# Patient Record
Sex: FEMALE | Race: WHITE | Employment: STUDENT | URBAN - METROPOLITAN AREA
[De-identification: names, ages, dates, MRNs, and addresses within clinical notes are randomized per-mention and may not be internally consistent; named-entity substitution may affect disease eponyms.]

---
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
## 2024-01-10 ENCOUNTER — OFFICE VISIT (OUTPATIENT)
Dept: OBSTETRICS AND GYNECOLOGY | Facility: CLINIC | Age: 24
End: 2024-01-10
Payer: COMMERCIAL

## 2024-01-10 ENCOUNTER — LAB VISIT (OUTPATIENT)
Dept: LAB | Facility: OTHER | Age: 24
End: 2024-01-10
Payer: COMMERCIAL

## 2024-01-10 VITALS
BODY MASS INDEX: 29.24 KG/M2 | WEIGHT: 175.5 LBS | SYSTOLIC BLOOD PRESSURE: 114 MMHG | HEIGHT: 65 IN | DIASTOLIC BLOOD PRESSURE: 80 MMHG

## 2024-01-10 DIAGNOSIS — T36.95XA ANTIBIOTIC-INDUCED YEAST INFECTION: ICD-10-CM

## 2024-01-10 DIAGNOSIS — Z72.9 PROBLEM RELATED TO LIFESTYLE: ICD-10-CM

## 2024-01-10 DIAGNOSIS — B37.9 ANTIBIOTIC-INDUCED YEAST INFECTION: ICD-10-CM

## 2024-01-10 DIAGNOSIS — N93.9 VAGINAL SPOTTING: ICD-10-CM

## 2024-01-10 DIAGNOSIS — Z11.3 SCREENING FOR STD (SEXUALLY TRANSMITTED DISEASE): ICD-10-CM

## 2024-01-10 DIAGNOSIS — N89.8 VAGINAL DISCHARGE: Primary | ICD-10-CM

## 2024-01-10 LAB
HBV SURFACE AG SERPL QL IA: NORMAL
HCV AB SERPL QL IA: NORMAL
HIV 1+2 AB+HIV1 P24 AG SERPL QL IA: NORMAL

## 2024-01-10 PROCEDURE — 36415 COLL VENOUS BLD VENIPUNCTURE: CPT

## 2024-01-10 PROCEDURE — 87389 HIV-1 AG W/HIV-1&-2 AB AG IA: CPT

## 2024-01-10 PROCEDURE — 99999 PR PBB SHADOW E&M-EST. PATIENT-LVL III: CPT | Mod: PBBFAC,,,

## 2024-01-10 PROCEDURE — 87491 CHLMYD TRACH DNA AMP PROBE: CPT

## 2024-01-10 PROCEDURE — 86592 SYPHILIS TEST NON-TREP QUAL: CPT

## 2024-01-10 PROCEDURE — 87340 HEPATITIS B SURFACE AG IA: CPT

## 2024-01-10 PROCEDURE — 99204 OFFICE O/P NEW MOD 45 MIN: CPT | Mod: S$GLB,,,

## 2024-01-10 PROCEDURE — 86803 HEPATITIS C AB TEST: CPT

## 2024-01-10 RX ORDER — CEFACLOR 250 MG
250 CAPSULE ORAL
COMMUNITY
Start: 2024-01-03 | End: 2024-01-13

## 2024-01-10 RX ORDER — FLUCONAZOLE 200 MG/1
200 TABLET ORAL
Qty: 2 TABLET | Refills: 0 | Status: SHIPPED | OUTPATIENT
Start: 2024-01-10

## 2024-01-10 NOTE — PROGRESS NOTES
"CC: STD testing/ vaginal discharge     HPI:  Julia Mejia is a 23 y.o. female  presents to walk in clinic with complaint of vaginal discharge for the past 5 days. Reports some spotting as well. Reports itching. Denies odor. States she is taking antibiotics for an ear infection. Thinks that she may have a yeast infection from antibiotics. Used monistat 3- finished last night. States this helped her symptoms. Does report being SA with multiple partners, condoms sometimes. Has an IUD in place- placed 23.    History reviewed. No pertinent past medical history.  History reviewed. No pertinent surgical history.  Social History     Tobacco Use    Smoking status: Never    Smokeless tobacco: Never   Substance Use Topics    Alcohol use: Not Currently     Comment: SOCIALLY    Drug use: Never     History reviewed. No pertinent family history.  OB History    Para Term  AB Living   0 0 0 0 0 0   SAB IAB Ectopic Multiple Live Births   0 0 0 0 0       /80   Ht 5' 5" (1.651 m)   Wt 79.6 kg (175 lb 7.8 oz)   BMI 29.20 kg/m²     ROS:  GENERAL: No fever, chills, fatigability or weight loss.  VULVAR: No pain, no lesions and no itching.  VAGINAL: No relaxation, no abnormal bleeding and no lesions. +itching, discharge  ABDOMEN: No abdominal pain. Denies nausea. Denies vomiting. No diarrhea. No constipation  BREAST: Denies pain. No lumps. No discharge.  URINARY: No incontinence, no nocturia, no frequency and no dysuria.  CARDIOVASCULAR: No chest pain. No shortness of breath. No leg cramps.  NEUROLOGICAL: No headaches. No vision changes.    PHYSICAL EXAM:  VULVA: normal appearing vulva with no masses, tenderness or lesions   VAGINA: normal appearing vagina with normal color. Bloody discharge, no lesions   CERVIX: normal appearing cervix without discharge or lesions, IUD visualized     ASSESSMENT and PLAN:    ICD-10-CM ICD-9-CM    1. Vaginal discharge  N89.8 623.5 C. trachomatis/N. gonorrhoeae by AMP " DNA [facility]; Urine      fluconazole (DIFLUCAN) 200 MG Tab      2. Screening for STD (sexually transmitted disease)  Z11.3 V74.5 HIV 1/2 Ag/Ab (4th Gen)      RPR      Hepatitis B Surface Antigen      HEPATITIS C ANTIBODY      C. trachomatis/N. gonorrhoeae by AMP DNA [facility]; Urine      3. Problem related to lifestyle  Z72.9 V69.9 HIV 1/2 Ag/Ab (4th Gen)      RPR      Hepatitis B Surface Antigen      HEPATITIS C ANTIBODY      C. trachomatis/N. gonorrhoeae by AMP DNA [facility]; Urine      4. Vaginal spotting  N93.9 623.8 C. trachomatis/N. gonorrhoeae by AMP DNA [facility]; Urine      5. Antibiotic-induced yeast infection  B37.9 112.9 fluconazole (DIFLUCAN) 200 MG Tab    T36.95XA E930.9         - GC/CT urine   - AFFIRM deferred due to recent monistat use and insurance - suspect yeast due to exam, s/s- Diflucan sent   - STD blood work ordered   - Discussed spotting can be normal with IUD- will r/o infection    Patient was counseled today on vaginitis prevention including :  a. avoiding feminine products such as deoderant soaps, body wash, bubble bath, douches, scented toilet paper, deoderant tampons or pads, feminine wipes, chronic pad use, etc.  b. avoiding other vulvovaginal irritants such as long hot baths, humidity, tight, synthetic clothing, chlorine and sitting around in wet bathing suits  c. wearing cotton underwear, avoiding thong underwear and no underwear to bed  d. taking showers instead of baths and use a hair dryer on cool setting afterwards to dry  e. wearing cotton to exercise and shower immediately after exercise and change clothes  f. using polyurethane condoms without spermicide if sexually active and symptoms are triggered by intercourse    FOLLOW UP: PRN lack of improvement.    FARRAH Tello

## 2024-01-11 LAB — RPR SER QL: NORMAL

## 2024-01-12 LAB
C TRACH DNA SPEC QL NAA+PROBE: NOT DETECTED
N GONORRHOEA DNA SPEC QL NAA+PROBE: NOT DETECTED

## 2024-05-16 ENCOUNTER — OFFICE VISIT (OUTPATIENT)
Dept: OBSTETRICS AND GYNECOLOGY | Facility: CLINIC | Age: 24
End: 2024-05-16
Payer: COMMERCIAL

## 2024-05-16 VITALS
DIASTOLIC BLOOD PRESSURE: 81 MMHG | WEIGHT: 172.38 LBS | SYSTOLIC BLOOD PRESSURE: 122 MMHG | HEIGHT: 65 IN | BODY MASS INDEX: 28.72 KG/M2

## 2024-05-16 DIAGNOSIS — N76.0 ACUTE VAGINITIS: Primary | ICD-10-CM

## 2024-05-16 DIAGNOSIS — Z20.2 POTENTIAL EXPOSURE TO STD: ICD-10-CM

## 2024-05-16 PROCEDURE — 99999 PR PBB SHADOW E&M-EST. PATIENT-LVL III: CPT | Mod: PBBFAC,,, | Performed by: FAMILY MEDICINE

## 2024-05-16 PROCEDURE — 87491 CHLMYD TRACH DNA AMP PROBE: CPT | Performed by: FAMILY MEDICINE

## 2024-05-16 PROCEDURE — 81514 NFCT DS BV&VAGINITIS DNA ALG: CPT | Performed by: FAMILY MEDICINE

## 2024-05-16 PROCEDURE — 87591 N.GONORRHOEAE DNA AMP PROB: CPT | Performed by: FAMILY MEDICINE

## 2024-05-16 PROCEDURE — 99213 OFFICE O/P EST LOW 20 MIN: CPT | Mod: S$GLB,,, | Performed by: FAMILY MEDICINE

## 2024-05-16 RX ORDER — METRONIDAZOLE 7.5 MG/G
1 GEL VAGINAL NIGHTLY
Qty: 70 G | Refills: 0 | Status: SHIPPED | OUTPATIENT
Start: 2024-05-16 | End: 2024-05-21

## 2024-05-16 NOTE — PROGRESS NOTES
CC: Vaginitis  HPI: Patient presents for evaluation of an abnormal vaginal discharge. Symptoms have been present for 1 week. Vaginal symptoms: bumps, discharge described as white and milky, and local irritation. Contraception: mirena.  Sexually transmitted infection risk: very low risk of STD exposure. SA male partner, does not use condoms. Also a vaginal bump around the clitoris she noticed 1 week ago. Not getting larger, no fever/drainage. No hx of genital HSV or exposure she knew of. This is the extent of the patient's complaints at this time.     ROS:  GENERAL: No fever, chills, fatigability or weight loss.  VULVAR: No pain, + lesions and no itching.  VAGINAL: No relaxation, + itching, + discharge, no abnormal bleeding and no lesions.  URINARY: No incontinence, no nocturia, no frequency and no dysuria.     PHYSICAL EXAM:  Physical Exam:   Constitutional: She appears well-developed and well-nourished. She does not appear ill. No distress.               Genitourinary:    Uterus, right adnexa and left adnexa normal.            Pelvic exam was performed with patient in the lithotomy position.   The external female genitalia was normal.     Labial bartholins normal.There is no rash, tenderness or lesion on the right labia. There is no rash, tenderness or lesion on the left labia. Cervix is normal. Right adnexum displays no mass, no tenderness and no fullness. Left adnexum displays no mass, no tenderness and no fullness. There is vaginal discharge (slightly yellow milky, no odor) in the vagina. No tenderness, bleeding, rectocele, cystocele or prolapse of vaginal walls in the vagina.    No foreign body in the vagina.   Cervix exhibits no motion tenderness, no lesion, no discharge, no friability and no polyp. Normal urethral meatus.Urethra findings: no urethral massIUD strings visualized.              Neurological: She is alert. GCS eye subscore is 4. GCS verbal subscore is 5. GCS motor subscore is 6.            History reviewed. No pertinent past medical history.    History reviewed. No pertinent surgical history.    No family history on file.    Social History     Socioeconomic History    Marital status: Single   Tobacco Use    Smoking status: Never    Smokeless tobacco: Never   Substance and Sexual Activity    Alcohol use: Yes     Comment: SOCIALLY    Drug use: Never    Sexual activity: Yes     Partners: Male     Birth control/protection: I.U.D.     Social Determinants of Health     Financial Resource Strain: Low Risk  (7/6/2023)    Received from [facility] Everywhere    Overall Financial Resource Strain (CARDIA)     Difficulty of Paying Living Expenses: Not hard at all   Food Insecurity: No Food Insecurity (7/6/2023)    Received from [facility]    Hunger Vital Sign     Worried About Running Out of Food in the Last Year: Never true     Ran Out of Food in the Last Year: Never true   Transportation Needs: No Transportation Needs (7/6/2023)    Received from [facility] Everywhere    PRAPARE - Transportation     Lack of Transportation (Medical): No     Lack of Transportation (Non-Medical): No   Physical Activity: Sufficiently Active (7/6/2023)    Received from [facility]    Exercise Vital Sign     Days of Exercise per Week: 4 days     Minutes of Exercise per Session: 40 min   Stress: No Stress Concern Present (7/6/2023)    Received from [facility]    [employer] - Occupational Stress Questionnaire     Feeling of Stress : Only a little   Housing Stability: Low Risk  (7/6/2023)    Received from [facility]    Housing Stability Vital Sign     Unable to Pay for Housing in the Last Year: No     Number of Places Lived in the Last Year: 1     In the last 12 months, was there a time when you did not have a steady place to sleep or slept in a shelter (including now)?: No       Current Outpatient Medications   Medication Sig Dispense  Refill    levonorgestreL (MIRENA) 21 mcg/24 hours (8 yrs) 52 mg IUD 1 each by Intrauterine route once.      fluconazole (DIFLUCAN) 200 MG Tab Take 1 tablet (200 mg total) by mouth every 72 hours as needed (yeast). (Patient not taking: Reported on 2024) 2 tablet 0    metroNIDAZOLE (METROGEL) 0.75 % (37.5mg/5 gram) vaginal gel Place 1 applicator vaginally every evening. For 5 nights - do not drink alcohol while taking and for 2 days after stopping for 5 days 70 g 0     No current facility-administered medications for this visit.       Review of patient's allergies indicates:   Allergen Reactions    Minocycline Rash    Amoxicillin Nausea And Vomiting, Hives, Nausea Only and Rash     As an infant/toddler          OB History    Para Term  AB Living   0 0 0 0 0 0   SAB IAB Ectopic Multiple Live Births   0 0 0 0 0          Assessment/Plan:    Acute vaginitis  -     Vaginosis Screen by DNA Probe  -     metroNIDAZOLE (METROGEL) 0.75 % (37.5mg/5 gram) vaginal gel; Place 1 applicator vaginally every evening. For 5 nights - do not drink alcohol while taking and for 2 days after stopping for 5 days  Dispense: 70 g; Refill: 0    Potential exposure to STD  -     C. trachomatis/N. gonorrhoeae by AMP DNA [facility]; Cervicovaginal      Discussed unsure of insurance coverage with vaginosis swab. Pt would still like to do.   -notify clinic if vaginal bump changing/worsening    Patient was counseled today on vaginitis prevention including :  a. avoiding feminine products such as deoderant soaps, body wash, bubble bath, douches, scented toilet paper, deoderant tampons or pads, feminine wipes, chronic pad use, etc.  b. avoiding other vulvovaginal irritants such as long hot baths, humidity, tight, synthetic clothing, chlorine and sitting around in wet bathing suits  c. wearing cotton underwear, avoiding thong underwear and no underwear to bed  d. taking showers instead of baths and use a hair dryer on cool setting  afterwards to dry  e. wearing cotton to exercise and shower immediately after exercise and change clothes  f. using polyurethane condoms without spermicide if sexually active and symptoms are triggered by intercourse     FOLLOW UP: PRN/lack of improvement.

## 2024-05-20 LAB
BACTERIAL VAGINOSIS DNA: NEGATIVE
C TRACH DNA SPEC QL NAA+PROBE: NOT DETECTED
CANDIDA GLABRATA DNA: NEGATIVE
CANDIDA KRUSEI DNA: NEGATIVE
CANDIDA RRNA VAG QL PROBE: NEGATIVE
N GONORRHOEA DNA SPEC QL NAA+PROBE: NOT DETECTED
T VAGINALIS RRNA GENITAL QL PROBE: NEGATIVE

## 2024-12-05 ENCOUNTER — TELEPHONE (OUTPATIENT)
Dept: SURGERY | Facility: CLINIC | Age: 24
End: 2024-12-05
Payer: COMMERCIAL

## 2024-12-06 ENCOUNTER — OFFICE VISIT (OUTPATIENT)
Facility: CLINIC | Age: 24
End: 2024-12-06
Payer: COMMERCIAL

## 2024-12-06 VITALS
SYSTOLIC BLOOD PRESSURE: 146 MMHG | WEIGHT: 174.63 LBS | HEIGHT: 65 IN | HEART RATE: 91 BPM | BODY MASS INDEX: 29.09 KG/M2 | RESPIRATION RATE: 18 BRPM | DIASTOLIC BLOOD PRESSURE: 86 MMHG

## 2024-12-06 DIAGNOSIS — K64.4 HEMORRHOIDAL SKIN TAGS: Primary | ICD-10-CM

## 2024-12-06 DIAGNOSIS — K62.89 HYPERTROPHIED ANAL PAPILLA: ICD-10-CM

## 2024-12-06 PROCEDURE — 99999 PR PBB SHADOW E&M-EST. PATIENT-LVL III: CPT | Mod: PBBFAC,,, | Performed by: COLON & RECTAL SURGERY

## 2024-12-06 RX ORDER — MULTIVITAMIN
1 TABLET ORAL DAILY
COMMUNITY

## 2024-12-06 RX ORDER — CETIRIZINE HYDROCHLORIDE 10 MG/1
10 TABLET ORAL DAILY
COMMUNITY

## 2024-12-06 RX ORDER — SPIRONOLACTONE 25 MG/1
25 TABLET ORAL DAILY
COMMUNITY
Start: 2024-07-01

## 2024-12-06 RX ORDER — FLUTICASONE PROPIONATE 50 MCG
1 SPRAY, SUSPENSION (ML) NASAL DAILY
COMMUNITY

## 2024-12-06 RX ORDER — LISDEXAMFETAMINE DIMESYLATE 30 MG/1
30 CAPSULE ORAL EVERY MORNING
COMMUNITY
Start: 2024-11-07

## 2024-12-06 NOTE — PROGRESS NOTES
"Subjective:       Patient ID: Julia Mejia is a 24 y.o. female.    Chief Complaint: Hemorrhoids    HPI  23 yo F has had issues w "hemorrhoids" for years - mainly pain/swelling/discomfort when they flare up - PCP has been helping her manage them conservatively - [place] [facility] [place] but her surgeon cancelled. She previously had issues with constipation/straining - she now is more careful about what she eats, takes stool softener daily, is more careful about not straining - typically has a soft, formed BM 1-2x/day. No rectal bleeding or blood in stool.  She has not had a flare in 8 months.     Last colonoscopy - none  No family hx of CRC or IBD.    Review of patient's allergies indicates:   Allergen Reactions    Minocycline Rash    Amoxicillin Nausea And Vomiting, Hives, Nausea Only and Rash     As an infant/toddler       History reviewed. No pertinent past medical history.    History reviewed. No pertinent surgical history.    Current Outpatient Medications   Medication Sig Dispense Refill    cetirizine (ZYRTEC) 10 MG tablet Take 10 mg by mouth once daily.      docusate sodium (COLACE) 50 MG capsule Take 50 mg by mouth 2 (two) times daily.      fluticasone propionate (FLONASE) 50 mcg/actuation nasal spray 1 spray by Each Nostril route once daily.      lisdexamfetamine (VYVANSE) 30 MG capsule Take 30 mg by mouth every morning.      multivitamin (THERAGRAN) per tablet Take 1 tablet by mouth once daily.      spironolactone (ALDACTONE) 25 MG tablet Take 25 mg by mouth once daily.      levonorgestreL (MIRENA) 21 mcg/24 hours (8 yrs) 52 mg IUD 1 each by Intrauterine route once.       No current facility-administered medications for this visit.       Family History   Problem Relation Name Age of Onset    Crohn's disease Maternal Grandfather      Ulcerative colitis Maternal Cousin         Social History     Socioeconomic History    Marital status: Single   Tobacco Use    Smoking status: Never    " "Smokeless tobacco: Never   Substance and Sexual Activity    Alcohol use: Yes     Comment: SOCIALLY    Drug use: Never    Sexual activity: Yes     Partners: Male     Birth control/protection: I.U.D.     Social Drivers of Health     Financial Resource Strain: Low Risk  (7/6/2023)    Received from [facility]    Overall Financial Resource Strain (CARDIA)     Difficulty of Paying Living Expenses: Not hard at all   Food Insecurity: No Food Insecurity (7/6/2023)    Received from [facility]    Hunger Vital Sign     Worried About Running Out of Food in the Last Year: Never true     Ran Out of Food in the Last Year: Never true   Transportation Needs: No Transportation Needs (7/6/2023)    Received from [facility] [employer]    PRAPARE - Transportation     Lack of Transportation (Medical): No     Lack of Transportation (Non-Medical): No   Physical Activity: Sufficiently Active (7/6/2023)    Received from [facility]    Exercise Vital Sign     Days of Exercise per Week: 4 days     Minutes of Exercise per Session: 40 min   Stress: No Stress Concern Present (7/6/2023)    Received from [facility] - Occupational Stress Questionnaire     Feeling of Stress : Only a little   Housing Stability: Low Risk  (7/6/2023)    Received from [facility]    Housing Stability Vital Sign     Unable to Pay for Housing in the Last Year: No     Number of Places Lived in the Last Year: 1     Unstable Housing in the Last Year: No       Review of Systems   Constitutional:  Negative for chills and fever.   HENT:  Negative for congestion and sore throat.    Eyes:  Negative for visual disturbance.   Respiratory:  Negative for cough and shortness of breath.    Cardiovascular:  Negative for chest pain and palpitations.   Gastrointestinal:  Positive for rectal pain (When hemorrhoids are "flaring"). Negative for abdominal distention, abdominal pain, " "anal bleeding, blood in stool, constipation, diarrhea, nausea and vomiting.   Endocrine: Negative for cold intolerance and heat intolerance.   Genitourinary:  Negative for dysuria and frequency.   Musculoskeletal:  Negative for arthralgias, back pain and neck pain.   Skin:  Negative for rash.   Allergic/Immunologic: Negative for immunocompromised state.   Neurological:  Negative for dizziness, light-headedness and headaches.   Hematological:  Does not bruise/bleed easily.   Psychiatric/Behavioral:  Negative for confusion. The patient is not nervous/anxious.        Objective:      Physical Exam  Vitals reviewed. Exam conducted with a chaperone present.   Constitutional:       Appearance: She is well-developed.   HENT:      Head: Normocephalic.   Pulmonary:      Effort: Pulmonary effort is normal. No respiratory distress.   Abdominal:      General: There is no distension.      Palpations: Abdomen is soft. There is no mass.      Tenderness: There is no abdominal tenderness. There is no guarding or rebound.   Genitourinary:     Comments: Perineum - normal perianal skin, no mass, no fissure, small external hemorrhoidal tags  JOSEPH and RL, larger pedunculated tag PML  ARMANDO - good tone, no mass  Anoscopy - Grade 1 internal hemorrhoids, +prolapsing internal hypertrophied papilla    Musculoskeletal:         General: Normal range of motion.   Skin:     General: Skin is warm and dry.   Neurological:      Mental Status: She is alert and oriented to person, place, and time.           No results found for: "WBC", "HGB", "HCT", "MCV", "PLT"  BMP  Lab Results   Component Value Date     10/28/2022    K 4.2 10/28/2022    CO2 26 10/28/2022    BUN 9 10/28/2022    CREATININE 0.66 10/28/2022    CALCIUM 9.4 10/28/2022     CMP  Sodium   Date Value Ref Range Status   10/28/2022 138 135 - 146 mmol/L Final     Potassium   Date Value Ref Range Status   10/28/2022 4.2 3.5 - 5.3 mmol/L Final     Carbon Dioxide   Date Value Ref Range Status " "  10/28/2022 26 20 - 32 mmol/L Final     Glucose   Date Value Ref Range Status   10/28/2022 89 65 - 99 mg/dL Final     Comment:                  Fasting reference interval     Blood Urea Nitrogen   Date Value Ref Range Status   10/28/2022 9 7 - 25 mg/dL Final     Creatinine   Date Value Ref Range Status   10/28/2022 0.66 0.50 - 0.96 mg/dL Final     Calcium   Date Value Ref Range Status   10/28/2022 9.4 8.6 - 10.2 mg/dL Final     Albumin Level   Date Value Ref Range Status   10/28/2022 4.4 3.6 - 5.1 g/dL Final     Total Bilirubin   Date Value Ref Range Status   10/28/2022 0.5 0.2 - 1.2 mg/dL Final     Alkaline Phosphatase   Date Value Ref Range Status   10/28/2022 50 31 - 125 U/L Final     AST   Date Value Ref Range Status   10/28/2022 15 10 - 30 U/L Final     ALT   Date Value Ref Range Status   10/28/2022 9 6 - 29 U/L Final     No results found for: "CEA"  No results found for: "CRP"        Assessment:       1. Hemorrhoidal skin tags    2. Hypertrophied anal papilla        Plan:   Discussed excision of pedunculated skin tag and hypertrophied papilla - she would like to proceed but will call to set up a surgery date after the New Year.    I have discussed the procedure at length with Julia Mejia.  We discussed the rationale, risks, benefits, and alternatives in depth.  We discussed the expected outcomes and potential complications including but not limited to wound dehiscence/delayed wound healing, bleeding, infection, recurrence, prolonged pain, need for further procedures, and altered continence.  She verbalized her understanding of the procedure and wishes to proceed.  Written consent was obtained.    Jaxon Bailey MD, FACS, FASCRS  , Department of Colon & Rectal Surgery     This note was created using voice recognition software, and may contain some unrecognized transcriptional errors.     "

## 2024-12-09 ENCOUNTER — TELEPHONE (OUTPATIENT)
Dept: SURGERY | Facility: CLINIC | Age: 24
End: 2024-12-09
Payer: COMMERCIAL

## 2024-12-09 DIAGNOSIS — K64.4 HEMORRHOIDAL SKIN TAGS: ICD-10-CM

## 2024-12-09 DIAGNOSIS — K62.89 HYPERTROPHIED ANAL PAPILLA: Primary | ICD-10-CM

## 2024-12-09 NOTE — TELEPHONE ENCOUNTER
"----- Message from Jenny sent at 12/9/2024 10:35 AM CST -----  Regarding: pt advice  Contact: [phone]  .Name Of Caller: Self     Contact Preference?: [phone]        What is the nature of the call?: needing to speak with nurse raul Arredondo call      Additional Notes:  "Thank you for all that you do for our patients"  "

## 2024-12-09 NOTE — TELEPHONE ENCOUNTER
Spoke with patient, she would like to schedule her surgery with Dr Bailey for 1/24/2025. Will email pre op instructions.

## 2025-02-03 ENCOUNTER — TELEPHONE (OUTPATIENT)
Dept: SURGERY | Facility: CLINIC | Age: 25
End: 2025-02-03
Payer: COMMERCIAL

## 2025-02-03 ENCOUNTER — OFFICE VISIT (OUTPATIENT)
Dept: OBSTETRICS AND GYNECOLOGY | Facility: CLINIC | Age: 25
End: 2025-02-03
Payer: COMMERCIAL

## 2025-02-03 ENCOUNTER — LAB VISIT (OUTPATIENT)
Dept: LAB | Facility: OTHER | Age: 25
End: 2025-02-03
Attending: FAMILY MEDICINE
Payer: COMMERCIAL

## 2025-02-03 VITALS
SYSTOLIC BLOOD PRESSURE: 122 MMHG | WEIGHT: 167.56 LBS | DIASTOLIC BLOOD PRESSURE: 82 MMHG | BODY MASS INDEX: 27.92 KG/M2 | HEIGHT: 65 IN

## 2025-02-03 DIAGNOSIS — Z20.2 POTENTIAL EXPOSURE TO STD: ICD-10-CM

## 2025-02-03 DIAGNOSIS — Z20.2 POTENTIAL EXPOSURE TO STD: Primary | ICD-10-CM

## 2025-02-03 LAB
HBV SURFACE AG SERPL QL IA: NORMAL
HCV AB SERPL QL IA: NEGATIVE
HIV 1+2 AB+HIV1 P24 AG SERPL QL IA: NEGATIVE
TREPONEMA PALLIDUM IGG+IGM AB [PRESENCE] IN SERUM OR PLASMA BY IMMUNOASSAY: NONREACTIVE

## 2025-02-03 PROCEDURE — 36415 COLL VENOUS BLD VENIPUNCTURE: CPT | Performed by: FAMILY MEDICINE

## 2025-02-03 PROCEDURE — 86803 HEPATITIS C AB TEST: CPT | Performed by: FAMILY MEDICINE

## 2025-02-03 PROCEDURE — 87340 HEPATITIS B SURFACE AG IA: CPT | Performed by: FAMILY MEDICINE

## 2025-02-03 PROCEDURE — 99999 PR PBB SHADOW E&M-EST. PATIENT-LVL III: CPT | Mod: PBBFAC,,, | Performed by: FAMILY MEDICINE

## 2025-02-03 PROCEDURE — 87389 HIV-1 AG W/HIV-1&-2 AB AG IA: CPT | Performed by: FAMILY MEDICINE

## 2025-02-03 PROCEDURE — 87491 CHLMYD TRACH DNA AMP PROBE: CPT | Performed by: FAMILY MEDICINE

## 2025-02-03 PROCEDURE — 86593 SYPHILIS TEST NON-TREP QUANT: CPT | Performed by: FAMILY MEDICINE

## 2025-02-03 PROCEDURE — 99213 OFFICE O/P EST LOW 20 MIN: CPT | Mod: S$GLB,,, | Performed by: FAMILY MEDICINE

## 2025-02-03 NOTE — TELEPHONE ENCOUNTER
----- Message from Corina sent at 2/3/2025  8:32 AM CST -----  Name of Caller:     Nature of Call:requesting an appt     Best Call Back Number:[phone]     Additional Information:HANH CASTILLO calling regarding Appointment Access  for wanting to reschedule the procedure. Pt stated she couldn't make due to weather. Please call back to assist.

## 2025-02-03 NOTE — PROGRESS NOTES
Note written by TATYANA coelho student. Addended by myself where needed. I was present for exam/discussion.       Chief Complaint: STD testing      HPI:   Pt is a 24 y.o. here today desiring STD testing. SA with male partners without condoms. Reports multiple male partners. Has no specific std concerns but likes to do routine testing. Denies vaginal discharge/itching/uti sx/fever/pelvic pain. Reports small bump to the L labia majora first noticed 3 days ago. Pt unsure if this is an ingrown hair. No drainage, no hx of HSV.This is the extent of the patient's complaints at this time.     ROS:  GENERAL: No fever, chills, fatigability or weight loss.  VULVAR: No pain, (+) lesions and no itching.  VAGINAL: No relaxation, no itching, no discharge, no abnormal bleeding and no lesions.  URINARY: No incontinence, no nocturia, no frequency and no dysuria.     PHYSICAL EXAM:  Physical Exam:   Constitutional: She appears well-developed and well-nourished. She does not appear ill. No distress.             Abdominal: Hernia confirmed negative in the right inguinal area and confirmed negative in the left inguinal area.     Genitourinary:    Urethra, vagina, uterus, right adnexa and left adnexa normal.            Pelvic exam was performed with patient in the lithotomy position.   The external female genitalia was normal.   No external genitalia lesions identified,  Labial bartholins normal.There is no rash, tenderness, lesion or injury on the right labia. There is lesion (very small mildly erythematous macular lesion. no vesicles/pustules/active drainage/fluctuance) on the left labia. There is no rash, tenderness or injury on the left labia. Cervix is normal. No no adexnal prolapse or no masses or organomegaly. Right adnexum displays no mass, no tenderness and no fullness. Left adnexum displays no mass, no tenderness and no fullness. Vagina exhibits no lesion. No erythema, vaginal discharge, tenderness, bleeding, rectocele,  cystocele or prolapse of vaginal walls in the vagina.    No foreign body in the vagina.      No signs of injury in the vagina.   Cervix exhibits no motion tenderness, no lesion, no discharge, no friability, no tenderness and no polyp. Uterus is not deviated, not enlarged, not fixed, not tender, not hosting fibroids and no uterine prolapse. Normal urethral meatus.Urethral Meatus exhibits: no urethral lesionUrethra findings: no urethral mass, no tenderness, no urethral scarring and no prolapsedIUD strings visualized.             Lymphadenopathy: No inguinal adenopathy noted on the right or left side.    Neurological: She is alert. GCS eye subscore is 4. GCS verbal subscore is 5. GCS motor subscore is 6.         History reviewed. No pertinent past medical history.    History reviewed. No pertinent surgical history.    Family History   Problem Relation Name Age of Onset    Crohn's disease Maternal Grandfather      Ulcerative colitis Maternal Cousin         Social History     Socioeconomic History    Marital status: Single   Tobacco Use    Smoking status: Never    Smokeless tobacco: Never   Substance and Sexual Activity    Alcohol use: Yes     Comment: SOCIALLY    Drug use: Never    Sexual activity: Yes     Partners: Male     Birth control/protection: I.U.D.     Social Drivers of Health     Financial Resource Strain: Low Risk  (7/6/2023)    Received from [facility]    Overall Financial Resource Strain (CARDIA)     Difficulty of Paying Living Expenses: Not hard at all   Food Insecurity: No Food Insecurity (7/6/2023)    Received from [facility]    Hunger Vital Sign     Worried About Running Out of Food in the Last Year: Never true     Ran Out of Food in the Last Year: Never true   Transportation Needs: No Transportation Needs (7/6/2023)    Received from [facility]    PRAPARE - Transportation     Lack of Transportation (Medical): No     Lack of Transportation (Non-Medical): No    Physical Activity: Sufficiently Active (2023)    Received from [facility]    Exercise Vital Sign     Days of Exercise per Week: 4 days     Minutes of Exercise per Session: 40 min   Stress: No Stress Concern Present (2023)    Received from [facility]    [facility] - Occupational Stress Questionnaire     Feeling of Stress : Only a little   Housing Stability: Low Risk  (2023)    Received from [facility]    Housing Stability Vital Sign     Unable to Pay for Housing in the Last Year: No     Number of Places Lived in the Last Year: 1     Unstable Housing in the Last Year: No       Current Outpatient Medications   Medication Sig Dispense Refill    cetirizine (ZYRTEC) 10 MG tablet Take 10 mg by mouth once daily.      docusate sodium (COLACE) 50 MG capsule Take 50 mg by mouth 2 (two) times daily.      fluticasone propionate (FLONASE) 50 mcg/actuation nasal spray 1 spray by Each Nostril route once daily.      levonorgestreL (MIRENA) 21 mcg/24 hours (8 yrs) 52 mg IUD 1 each by Intrauterine route once.      lisdexamfetamine (VYVANSE) 30 MG capsule Take 30 mg by mouth every morning.      multivitamin (THERAGRAN) per tablet Take 1 tablet by mouth once daily.      spironolactone (ALDACTONE) 25 MG tablet Take 25 mg by mouth once daily.       No current facility-administered medications for this visit.       Review of patient's allergies indicates:   Allergen Reactions    Minocycline Rash    Amoxicillin Nausea And Vomiting, Hives, Nausea Only and Rash     As an infant/toddler          OB History    Para Term  AB Living   0 0 0 0 0 0   SAB IAB Ectopic Multiple Live Births   0 0 0 0 0          Assessment/Plan:    Potential exposure to STD  -     Hepatitis C Antibody; Future; Expected date: 2025  -     Hepatitis B Surface Antigen; Future; Expected date: 2025  -     C. trachomatis/N. gonorrhoeae by AMP DNA [facility];  Cervicovaginal  -     HIV 1/2 Ag/Ab (4th Gen); Future; Expected date: 02/03/2025  -     Treponema Pallidium Antibodies IgG, IgM; Future; Expected date: 02/03/2025        Will call with results. Recommended Condom use 100% to prevent STDs  -warm compresses and otc abx ointment to the vaginal lesion, appears c/w very small superficial cyst/abscess. If worsening will notify clinic  RTC prn and for annual

## 2025-02-06 LAB
C TRACH DNA SPEC QL NAA+PROBE: NOT DETECTED
N GONORRHOEA DNA SPEC QL NAA+PROBE: NOT DETECTED

## 2025-02-13 ENCOUNTER — ANESTHESIA EVENT (OUTPATIENT)
Dept: SURGERY | Facility: HOSPITAL | Age: 25
End: 2025-02-13
Payer: COMMERCIAL

## 2025-02-13 NOTE — PRE-PROCEDURE INSTRUCTIONS
Patient reviewed on 2/13/2025.  Okay to proceed at [place]. The following pre-procedure instructions and arrival time have been reviewed with patient via phone and sent to patient portal for review.  Patient verbalized an understanding.  Pt to be accompanied by friend-Vivi mercer of procedure as responsible .    Dear Julia ,     You are scheduled for a procedure with Dr. Bailey on 2/14/2025. Your scheduled arrival time is 5:30 am, which is roughly 2 hours before your anticipated procedure time to allow sufficient time for pre-op.    This procedure will take place at the [facility] at the corner of [facility] and [facility].  It is in the [place] next to [facility].       The address is:  [address] [place] [address].  [place] [id number]     ~After entering the building, you will proceed to the second floor where you can check in with registration.   ~Please wear loose comfortable clothing. If you wear contact lenses, please wear glasses to your procedure.  ~You should take any medications that you routinely take for blood pressure (other than those listed below), heart medications, thyroid, cholesterol, etc.   ~Leave all jewelry and valuables at home.  ~If you wear dentures, please bring your case with you or leave them at home.  ~ Use and bring any inhalers that you may have.        Your fasting instructions are as follow:  Nothing to eat after midnight the evening before your surgery. You may drink clear liquids up until 2 hours prior to your arrival time. You MUST have a responsible adult to bring you home you can not drive yourself home after procedure.        The evening before and morning of your procedure, please hold the following medications:  -Aspirin and Aspirin-containing products (BC,Goody's powder, Excedrin...)  -NSAIDs (Advil, Ibuprofen, Aleve, Diclofenac)  -Vitamins/Supplements  -Herbal remedies/Teas  -Stimulants (Adderall, Vyvanse, Adipex)  -Diabetic  medication (Please bring with you day of procedure)  -Prescription or non-prescriptive lotions/creams/gels/ointments        -May take Tylenol        The evening before and morning of your procedure, take a shower using antibacterial soap (ex: Hibiclens or Dial antibacterial soap).   DO NOT apply deodorant, lotion, cologne, powder or anything else to the skin.  If you have any procedure-specific questions, please call your surgeon's office. Any other questions, don't hesitate to give me a call at [phone].        In the event that you are running late or need to reschedule on the day of your procedure, please contact the pre-op desk at [phone].       Please reply to this portal message as receipt of delivery.     Thanks,  ZEFERINO Salmon  Pre-Admit Testing  Anesthesia Dept [facility]

## 2025-02-14 ENCOUNTER — HOSPITAL ENCOUNTER (OUTPATIENT)
Facility: HOSPITAL | Age: 25
Discharge: HOME | End: 2025-02-14
Attending: COLON & RECTAL SURGERY | Admitting: COLON & RECTAL SURGERY
Payer: COMMERCIAL

## 2025-02-14 ENCOUNTER — ANESTHESIA (OUTPATIENT)
Dept: SURGERY | Facility: HOSPITAL | Age: 25
End: 2025-02-14
Payer: COMMERCIAL

## 2025-02-14 VITALS
SYSTOLIC BLOOD PRESSURE: 117 MMHG | BODY MASS INDEX: 27.49 KG/M2 | WEIGHT: 165 LBS | DIASTOLIC BLOOD PRESSURE: 79 MMHG | HEART RATE: 69 BPM | RESPIRATION RATE: 16 BRPM | OXYGEN SATURATION: 100 % | TEMPERATURE: 98 F | HEIGHT: 65 IN

## 2025-02-14 DIAGNOSIS — K64.9 HEMORRHOIDS: ICD-10-CM

## 2025-02-14 LAB
B-HCG UR QL: NEGATIVE
CTP QC/QA: YES

## 2025-02-14 PROCEDURE — 88305 TISSUE EXAM BY PATHOLOGIST: CPT | Performed by: PATHOLOGY

## 2025-02-14 PROCEDURE — 63600175 PHARM REV CODE 636 W HCPCS: Performed by: NURSE ANESTHETIST, CERTIFIED REGISTERED

## 2025-02-14 PROCEDURE — 99900035 HC TECH TIME PER 15 MIN (STAT)

## 2025-02-14 PROCEDURE — 37000008 HC ANESTHESIA 1ST 15 MINUTES: Performed by: COLON & RECTAL SURGERY

## 2025-02-14 PROCEDURE — 25000003 PHARM REV CODE 250: Performed by: NURSE ANESTHETIST, CERTIFIED REGISTERED

## 2025-02-14 PROCEDURE — 63600175 PHARM REV CODE 636 W HCPCS: Performed by: COLON & RECTAL SURGERY

## 2025-02-14 PROCEDURE — 25000003 PHARM REV CODE 250: Performed by: STUDENT IN AN ORGANIZED HEALTH CARE EDUCATION/TRAINING PROGRAM

## 2025-02-14 PROCEDURE — 36000704 HC OR TIME LEV I 1ST 15 MIN: Performed by: COLON & RECTAL SURGERY

## 2025-02-14 PROCEDURE — 71000033 HC RECOVERY, INTIAL HOUR: Performed by: COLON & RECTAL SURGERY

## 2025-02-14 PROCEDURE — 71000015 HC POSTOP RECOV 1ST HR: Performed by: COLON & RECTAL SURGERY

## 2025-02-14 PROCEDURE — 25000003 PHARM REV CODE 250: Performed by: NURSE PRACTITIONER

## 2025-02-14 PROCEDURE — 88342 IMHCHEM/IMCYTCHM 1ST ANTB: CPT | Performed by: PATHOLOGY

## 2025-02-14 PROCEDURE — 36000705 HC OR TIME LEV I EA ADD 15 MIN: Performed by: COLON & RECTAL SURGERY

## 2025-02-14 PROCEDURE — 37000009 HC ANESTHESIA EA ADD 15 MINS: Performed by: COLON & RECTAL SURGERY

## 2025-02-14 PROCEDURE — 94761 N-INVAS EAR/PLS OXIMETRY MLT: CPT

## 2025-02-14 PROCEDURE — 81025 URINE PREGNANCY TEST: CPT | Performed by: COLON & RECTAL SURGERY

## 2025-02-14 RX ORDER — HYDROMORPHONE HYDROCHLORIDE 1 MG/ML
0.2 INJECTION, SOLUTION INTRAMUSCULAR; INTRAVENOUS; SUBCUTANEOUS EVERY 5 MIN PRN
Status: DISCONTINUED | OUTPATIENT
Start: 2025-02-14 | End: 2025-02-14 | Stop reason: HOSPADM

## 2025-02-14 RX ORDER — LIDOCAINE HYDROCHLORIDE 20 MG/ML
INJECTION INTRAVENOUS
Status: DISCONTINUED | OUTPATIENT
Start: 2025-02-14 | End: 2025-02-14

## 2025-02-14 RX ORDER — MUPIROCIN 20 MG/G
OINTMENT TOPICAL
Status: DISCONTINUED | OUTPATIENT
Start: 2025-02-14 | End: 2025-02-14 | Stop reason: HOSPADM

## 2025-02-14 RX ORDER — BUPIVACAINE HYDROCHLORIDE 2.5 MG/ML
INJECTION, SOLUTION EPIDURAL; INFILTRATION; INTRACAUDAL
Status: DISCONTINUED | OUTPATIENT
Start: 2025-02-14 | End: 2025-02-14 | Stop reason: HOSPADM

## 2025-02-14 RX ORDER — OXYCODONE HYDROCHLORIDE 5 MG/1
5 TABLET ORAL
Status: DISCONTINUED | OUTPATIENT
Start: 2025-02-14 | End: 2025-02-14 | Stop reason: HOSPADM

## 2025-02-14 RX ORDER — GLUCAGON 1 MG
1 KIT INJECTION
Status: DISCONTINUED | OUTPATIENT
Start: 2025-02-14 | End: 2025-02-14 | Stop reason: HOSPADM

## 2025-02-14 RX ORDER — OXYCODONE HYDROCHLORIDE 5 MG/1
5 TABLET ORAL EVERY 4 HOURS PRN
Qty: 30 TABLET | Refills: 0 | Status: SHIPPED | OUTPATIENT
Start: 2025-02-14

## 2025-02-14 RX ORDER — PROPOFOL 10 MG/ML
VIAL (ML) INTRAVENOUS
Status: DISCONTINUED | OUTPATIENT
Start: 2025-02-14 | End: 2025-02-14

## 2025-02-14 RX ORDER — SODIUM CHLORIDE 0.9 % (FLUSH) 0.9 %
10 SYRINGE (ML) INJECTION DAILY PRN
Status: DISCONTINUED | OUTPATIENT
Start: 2025-02-14 | End: 2025-02-14 | Stop reason: HOSPADM

## 2025-02-14 RX ORDER — KETAMINE HCL IN 0.9 % NACL 50 MG/5 ML
SYRINGE (ML) INTRAVENOUS
Status: DISCONTINUED | OUTPATIENT
Start: 2025-02-14 | End: 2025-02-14

## 2025-02-14 RX ORDER — LIDOCAINE HYDROCHLORIDE 10 MG/ML
INJECTION, SOLUTION EPIDURAL; INFILTRATION; INTRACAUDAL; PERINEURAL
Status: DISCONTINUED | OUTPATIENT
Start: 2025-02-14 | End: 2025-02-14 | Stop reason: HOSPADM

## 2025-02-14 RX ORDER — MIDAZOLAM HYDROCHLORIDE 1 MG/ML
INJECTION INTRAMUSCULAR; INTRAVENOUS
Status: DISCONTINUED | OUTPATIENT
Start: 2025-02-14 | End: 2025-02-14

## 2025-02-14 RX ORDER — FENTANYL CITRATE 50 UG/ML
INJECTION, SOLUTION INTRAMUSCULAR; INTRAVENOUS
Status: DISCONTINUED | OUTPATIENT
Start: 2025-02-14 | End: 2025-02-14

## 2025-02-14 RX ORDER — SODIUM CHLORIDE 9 MG/ML
INJECTION, SOLUTION INTRAVENOUS CONTINUOUS
Status: DISCONTINUED | OUTPATIENT
Start: 2025-02-14 | End: 2025-02-14 | Stop reason: HOSPADM

## 2025-02-14 RX ORDER — SCOPOLAMINE 1 MG/3D
1 PATCH, EXTENDED RELEASE TRANSDERMAL ONCE
Status: DISCONTINUED | OUTPATIENT
Start: 2025-02-14 | End: 2025-02-14 | Stop reason: HOSPADM

## 2025-02-14 RX ORDER — LIDOCAINE HYDROCHLORIDE 10 MG/ML
1 INJECTION, SOLUTION EPIDURAL; INFILTRATION; INTRACAUDAL; PERINEURAL ONCE
Status: DISCONTINUED | OUTPATIENT
Start: 2025-02-14 | End: 2025-02-14 | Stop reason: HOSPADM

## 2025-02-14 RX ORDER — PROCHLORPERAZINE EDISYLATE 5 MG/ML
5 INJECTION INTRAMUSCULAR; INTRAVENOUS EVERY 30 MIN PRN
Status: DISCONTINUED | OUTPATIENT
Start: 2025-02-14 | End: 2025-02-14 | Stop reason: HOSPADM

## 2025-02-14 RX ADMIN — MIDAZOLAM HYDROCHLORIDE 1 MG: 1 INJECTION, SOLUTION INTRAMUSCULAR; INTRAVENOUS at 07:02

## 2025-02-14 RX ADMIN — MIDAZOLAM HYDROCHLORIDE 2 MG: 1 INJECTION, SOLUTION INTRAMUSCULAR; INTRAVENOUS at 07:02

## 2025-02-14 RX ADMIN — MUPIROCIN: 20 OINTMENT TOPICAL at 05:02

## 2025-02-14 RX ADMIN — PROPOFOL 150 MCG/KG/MIN: 10 INJECTION, EMULSION INTRAVENOUS at 07:02

## 2025-02-14 RX ADMIN — Medication 5 MG: at 07:02

## 2025-02-14 RX ADMIN — SCOPOLAMINE 1 PATCH: 1.5 PATCH, EXTENDED RELEASE TRANSDERMAL at 05:02

## 2025-02-14 RX ADMIN — Medication 20 MG: at 07:02

## 2025-02-14 RX ADMIN — FENTANYL CITRATE 25 MCG: 50 INJECTION, SOLUTION INTRAMUSCULAR; INTRAVENOUS at 07:02

## 2025-02-14 RX ADMIN — GLYCOPYRROLATE 0.1 MG: 0.2 INJECTION, SOLUTION INTRAMUSCULAR; INTRAVENOUS at 07:02

## 2025-02-14 RX ADMIN — LIDOCAINE HYDROCHLORIDE 100 MG: 20 INJECTION INTRAVENOUS at 07:02

## 2025-02-14 RX ADMIN — PROPOFOL 175 MCG/KG/MIN: 10 INJECTION, EMULSION INTRAVENOUS at 07:02

## 2025-02-14 RX ADMIN — SODIUM CHLORIDE: 9 INJECTION, SOLUTION INTRAVENOUS at 05:02

## 2025-02-14 NOTE — ANESTHESIA POSTPROCEDURE EVALUATION
Anesthesia Post Evaluation    Patient: Julia Mejia    Procedure(s) Performed: Procedure(s) (LRB):  REMOVAL,  HEMORRHOIDAL SKIN TAG (N/A)    Final Anesthesia Type: general      Patient location during evaluation: [facility]  Patient participation: Yes- Able to Participate  Level of consciousness: awake and alert, oriented and awake  Post-procedure vital signs: reviewed and stable  Pain management: adequate  Airway patency: patent  JORDAN mitigation strategies: Multimodal analgesia  PONV status at discharge: No PONV  Anesthetic complications: no      Cardiovascular status: blood pressure returned to baseline and hemodynamically stable  Respiratory status: unassisted and spontaneous ventilation  Hydration status: euvolemic  Follow-up not needed.              Vitals Value Taken Time   /79 02/14/25 0815   Temp 36.4 °C (97.6 °F) 02/14/25 0735   Pulse 71 02/14/25 0819   Resp 18 02/14/25 0819   SpO2 94 % 02/14/25 0819   Vitals shown include unfiled device data.      Event Time   Out of Recovery 07:50:00         Pain/Jonathan Score: Jonathan Score: 10 (2/14/2025  8:15 AM)

## 2025-02-14 NOTE — DISCHARGE SUMMARY
[facility]  Discharge Note  Short Stay    Procedure(s) (LRB):  REMOVAL,  HEMORRHOIDAL SKIN TAG (N/A)      Discharge Note      SUMMARY     Admit Date: 2/14/2025    Attending Physician: Jaxon Bailey MD     Discharge Physician: Jaxon Bailey MD    Discharge Date: 2/14/2025     Final Diagnosis: Post-Op Diagnosis Codes:     * Hypertrophied anal papilla [K62.89]     * Hemorrhoidal skin tags [K64.4]    Hospital Course: Patient was admitted for an outpatient procedure and tolerated the procedure well with no complications.    Disposition: Home or Self Care    Follow Up/Patient Instructions:     High fiber diet/daily fiber supplement  8-10 glasses of water/day  Colace 100 mg 2x/day while taking oxycodone  Miralax 1 capful in glass of water up to 4x/day as needed for constipation  Oxycodone as needed only for severe pain - may cause constipation  Do not drive while taking Oxycodone  Tylenol/Motrin/Advil as needed for milder pain  Avoid straining/constipation  Gauze packing will pass with 1st BM  Dry dressing as needed  Some bleeding is expected - call for excessive bleeding  Call for severe pain, fever >101, difficulty urinating, constipation  Follow-up with Dr. Bailey in 3 weeks       Current Discharge Medication List        START taking these medications    Details   oxyCODONE (ROXICODONE) 5 MG immediate release tablet Take 1 tablet (5 mg total) by mouth every 4 (four) hours as needed for Pain.  Qty: 30 tablet, Refills: 0    Comments: Quantity prescribed more than 7 day supply? No  Associated Diagnoses: Hemorrhoids           CONTINUE these medications which have NOT CHANGED    Details   cetirizine (ZYRTEC) 10 MG tablet Take 10 mg by mouth once daily.      docusate sodium (COLACE) 50 MG capsule Take 50 mg by mouth 2 (two) times daily.      fluticasone propionate (FLONASE) 50 mcg/actuation nasal spray 1 spray by Each Nostril route once daily.      lisdexamfetamine (VYVANSE) 30 MG capsule  Take 30 mg by mouth every morning.      multivitamin (THERAGRAN) per tablet Take 1 tablet by mouth once daily.      spironolactone (ALDACTONE) 25 MG tablet Take 25 mg by mouth once daily.      levonorgestreL (MIRENA) 21 mcg/24 hours (8 yrs) 52 mg IUD 1 each by Intrauterine route once.             Discharge Procedure Orders (must include Diet, Follow-up, Activity)   Discharge Procedure Orders (must include Diet, Follow-up, Activity)   Diet Adult Regular     Activity as tolerated        TIME SPENT ON DISCHARGE: 11 minutes

## 2025-02-14 NOTE — DISCHARGE INSTRUCTIONS
High fiber diet/daily fiber supplement  8-10 glasses of water/day  Colace 100 mg 2x/day while taking oxycodone  Miralax 1 capful in glass of water up to 4x/day as needed for constipation  Oxycodone as needed only for severe pain - may cause constipation  Do not drive while taking Oxycodone  Tylenol/Motrin/Advil as needed for milder pain  Avoid straining/constipation  Gauze packing will pass with 1st BM  Dry dressing as needed  Some bleeding is expected - call for excessive bleeding  Call for severe pain, fever >101, difficulty urinating, constipation  Follow-up with Dr. Bailey in 3 weeks

## 2025-02-14 NOTE — PLAN OF CARE
Chart reviewed. Pre-op nursing care completed per orders. Safe surgery checklist complete. Friend at bedside, pt belongings placed in PACU locker 15. Waiting for H&P and anesthesia consent prior to procedure. Call button within reach.

## 2025-02-14 NOTE — OP NOTE
DATE OF PROCEDURE:  02/14/2025     PREOPERATIVE DIAGNOSES:  Hemorrhoidal skin tags, hypertrophied anal papilla     POSTOPERATIVE DIAGNOSES:  Hemorrhoidal skin tags, hypertrophied anal papilla    PROCEDURES PERFORMED:  Excision of external hemorrhoidal skin tags and anal canal hypertrophied anal papilla     SURGEON:  Jaxon Bailey M.D.     ASSISTANT:  None     ANESTHESIA: Local/MAC     IV FLUIDS:  300 mL of crystalloid.     ESTIMATED BLOOD LOSS:  <5 mL.     DRAINS:  None     SPECIMENS:  Anal skin tags to pathology    COMPLICATIONS:  None.     OPERATIVE FINDINGS:    External hemorrhoidal skin tags in the posterior midline, right lateral, and right anterolateral positions  Prolapsing hypertrophied anal papilla in the posterior midline     INDICATIONS:  The patient is a 24-year-old female who was seen in the office with complaints of painful anal skin tags.  After discussing her options, she has elected to proceed with surgical excision.    DESCRIPTION OF PROCEDURE:  The patient was identified, brought to the Operating Room and placed on the OR table in a prone position, ensuring adequate padding of all pressure points, and after obtaining informed consent.  Venous sequential stockings were placed.  After initiation of monitored anesthesia care, the table was jackknifed and the buttocks were taped apart to efface the anal verge.  The perianal region was prepped and draped in the usual sterile fashion.      An anal block was performed using a combination of 1% lidocaine with epinephrine and 0.25% bupivacaine.  External inspection revealed enlarged skin tags in the posterior midline, right lateral, and right anterolateral positions.  These were all sharply excised and sent to pathology.  Hemostasis was achieved with electrocautery, and the skin incisions were closed with 3-0 chromic sutures.  Inspection of the anal canal was performed with a Hill-Barnard retractor.  In the posterior midline, there was a prolapsing  hypertrophied anal papilla.  This was excised with electrocautery and sent to pathology.  The mucosa was reapproximated with a 3-0 Vicryl figure-of-eight suture.  The anal canal was irrigated and noted to be hemostatic.  Gel-Foam gauze was placed within the anal canal, and sterile dressings were applied.     The patient tolerated the procedure well with no complications.  She was awakened from sedation in the Operating Room and taken to Recovery in satisfactory condition.  All needle, instrument and sponge counts were correct at the end of the case.  I was present throughout the entire procedure.    Jaxon Bailey MD, FACS, [facility]  Department of Colon & Rectal Surgery     This note was created using voice recognition software, and may contain some unrecognized transcriptional errors.

## 2025-02-14 NOTE — TRANSFER OF CARE
"Anesthesia Transfer of Care Note    Patient: Julia Mejia    Procedure(s) Performed: Procedure(s) (LRB):  REMOVAL,  HEMORRHOIDAL SKIN TAG (N/A)    Patient location: PACU    Anesthesia Type: general    Transport from OR: Transported from OR on room air with adequate spontaneous ventilation    Post pain: adequate analgesia    Post assessment: no apparent anesthetic complications    Post vital signs: stable    Level of consciousness: awake and alert    Nausea/Vomiting: no nausea/vomiting    Complications: none    Transfer of care protocol was followed      Last vitals: Visit Vitals  /85 (BP Location: Right arm, Patient Position: Lying)   Pulse 81   Temp (!) 16 °C (60.8 °F)   Resp 16   Ht 5' 5" (1.651 m)   Wt 74.8 kg (165 lb)   SpO2 99%   Breastfeeding No   BMI 27.46 kg/m²     "

## 2025-02-14 NOTE — ANESTHESIA PREPROCEDURE EVALUATION
[facility]  Anesthesia Pre-Operative Evaluation         Patient Name: Julia Mejia  YOB: 2000  MRN: [id number]    SUBJECTIVE:     Pre-operative evaluation for Procedure(s) (LRB):  HEMORRHOIDECTOMY INVOLVING TWO OR MORE ANAL COLUMNS (N/A)     02/13/2025    Julia Mejia is a 24 y.o. female  Patient now presents for the above procedure(s).    TTE:   none documented.     There is no problem list on file for this patient.      Review of patient's allergies indicates:   Allergen Reactions    Minocycline Rash    Amoxicillin Nausea And Vomiting, Hives, Nausea Only and Rash     As an infant/toddler       Current Inpatient Medications:      No current facility-administered medications on file prior to encounter.     Current Outpatient Medications on File Prior to Encounter   Medication Sig Dispense Refill    cetirizine (ZYRTEC) 10 MG tablet Take 10 mg by mouth once daily.      docusate sodium (COLACE) 50 MG capsule Take 50 mg by mouth 2 (two) times daily.      fluticasone propionate (FLONASE) 50 mcg/actuation nasal spray 1 spray by Each Nostril route once daily.      levonorgestreL (MIRENA) 21 mcg/24 hours (8 yrs) 52 mg IUD 1 each by Intrauterine route once.      lisdexamfetamine (VYVANSE) 30 MG capsule Take 30 mg by mouth every morning.      multivitamin (THERAGRAN) per tablet Take 1 tablet by mouth once daily.      spironolactone (ALDACTONE) 25 MG tablet Take 25 mg by mouth once daily.         No past surgical history on file.    OBJECTIVE:     Vital Signs Range (Last 24H):         Significant Labs:  Lab Results   Component Value Date    ALT 14 08/15/2024    AST 22 08/15/2024     (L) 08/15/2024    K 3.6 08/15/2024    CREATININE 0.68 08/15/2024    BUN 7.6 (L) 08/15/2024    CO2 25 08/15/2024       Diagnostic Studies: No relevant studies.    EKG:   No results found for this or any previous visit.    ASSESSMENT/PLAN:           Pre-op Assessment    I have reviewed the Patient  Summary Reports.     I have reviewed the Nursing Notes. I have reviewed the NPO Status.   I have reviewed the Medications.     Review of Systems  Anesthesia Hx:  No previous Anesthesia             Denies Family Hx of Anesthesia complications.    Denies Personal Hx of Anesthesia complications.                    Hematology/Oncology:  Hematology Normal   Oncology Normal                                   EENT/Dental:  EENT/Dental Normal           Cardiovascular:  Cardiovascular Normal                                              Pulmonary:  Pulmonary Normal                       Renal/:  Renal/ Normal                 Hepatic/GI:  Hepatic/GI Normal                    Neurological:  Neurology Normal                                      Endocrine:  Endocrine Normal            Psych:  Psychiatric Normal                    Physical Exam  General: Cooperative, Alert and Oriented    Airway:  Mallampati: II   Mouth Opening: Normal  TM Distance: Normal  Tongue: Normal  Neck ROM: Normal ROM    Dental:  Intact        Anesthesia Plan  Type of Anesthesia, risks & benefits discussed:    Anesthesia Type: Gen Natural Airway  Intra-op Monitoring Plan: Standard ASA Monitors  Post Op Pain Control Plan: multimodal analgesia and IV/PO Opioids PRN  Induction:  IV  Airway Plan: Video, Post-Induction  Informed Consent: Informed consent signed with the Patient and all parties understand the risks and agree with anesthesia plan.  All questions answered.   ASA Score: 1  Day of Surgery Review of History & Physical: H&P Update referred to the surgeon/provider.    Ready For Surgery From Anesthesia Perspective.     .

## 2025-02-14 NOTE — H&P
"Subjective:       Patient ID: Julia Mejia is a 24 y.o. female.    Chief Complaint: No chief complaint on file.    HPI  25 yo F has had issues w "hemorrhoids" for years - mainly pain/swelling/discomfort when they flare up - PCP has been helping her manage them conservatively - [place] [facility] [place] but her surgeon cancelled. She previously had issues with constipation/straining - she now is more careful about what she eats, takes stool softener daily, is more careful about not straining - typically has a soft, formed BM 1-2x/day. No rectal bleeding or blood in stool.  She has not had a flare in 8 months.      Last colonoscopy - none  No family hx of CRC or IBD.      Review of patient's allergies indicates:   Allergen Reactions    Minocycline Rash    Amoxicillin Nausea And Vomiting, Hives, Nausea Only and Rash     As an infant/toddler       History reviewed. No pertinent past medical history.    History reviewed. No pertinent surgical history.    Current Facility-Administered Medications   Medication Dose Route Frequency Provider Last Rate Last Admin    0.9% NaCl infusion   Intravenous Continuous Rahel Love NP 70 mL/hr at 02/14/25 0553 New Bag at 02/14/25 0553    LIDOcaine (PF) 10 mg/ml (1%) injection 10 mg  1 mL Intradermal Once Rahel Love NP        mupirocin 2 % ointment   Nasal On Call Procedure Rhael Love NP   Given at 02/14/25 0552    scopolamine 1.3-1.5 mg (1 mg over 3 days) 1 patch  1 patch Transdermal Once Javier Ron MD   1 patch at 02/14/25 0552       Family History   Problem Relation Name Age of Onset    Crohn's disease Maternal Grandfather      Ulcerative colitis Maternal Cousin         Social History     Socioeconomic History    Marital status: Single   Tobacco Use    Smoking status: Never    Smokeless tobacco: Never   Substance and Sexual Activity    Alcohol use: Yes     Comment: SOCIALLY    Drug use: Never    Sexual activity: Yes     Partners: " Male     Birth control/protection: I.U.D.     Social Drivers of Health     Financial Resource Strain: Low Risk  (7/6/2023)    Received from [facility] [employer] [facility] [employer]    Overall Financial Resource Strain (CARDIA)     Difficulty of Paying Living Expenses: Not hard at all   Food Insecurity: No Food Insecurity (7/6/2023)    Received from [facility] [employer] [facility]    Hunger Vital Sign     Worried About Running Out of Food in the Last Year: Never true     Ran Out of Food in the Last Year: Never true   Transportation Needs: No Transportation Needs (7/6/2023)    Received from [facility] [employer] [facility] [employer]    PRAPARE - Transportation     Lack of Transportation (Medical): No     Lack of Transportation (Non-Medical): No   Physical Activity: Sufficiently Active (7/6/2023)    Received from [facility] [employer] [facility] [employer]    Exercise Vital Sign     Days of Exercise per Week: 4 days     Minutes of Exercise per Session: 40 min   Stress: No Stress Concern Present (7/6/2023)    Received from [facility] [employer] [facility] [employer]    [employer] [place] [employer] - Occupational Stress Questionnaire     Feeling of Stress : Only a little   Housing Stability: Low Risk  (7/6/2023)    Received from [facility] [employer] [facility]    Housing Stability Vital Sign     Unable to Pay for Housing in the Last Year: No     Number of Places Lived in the Last Year: 1     Unstable Housing in the Last Year: No       Review of Systems   Gastrointestinal:  Positive for rectal pain. Negative for abdominal distention, abdominal pain, anal bleeding, blood in stool, constipation, diarrhea, nausea and vomiting.   All other systems reviewed and are negative.      Objective:      Physical Exam  Vitals reviewed. Exam conducted with a chaperone present.   Constitutional:       Appearance: She is well-developed.   HENT:      Head: Normocephalic.   Pulmonary:      Effort: Pulmonary effort is normal. No respiratory distress.   Abdominal:      General: There is no distension.       "Palpations: Abdomen is soft. There is no mass.      Tenderness: There is no abdominal tenderness. There is no guarding or rebound.   Genitourinary:     Comments: Perineum - normal perianal skin, no mass, no fissure, small external hemorrhoidal tags  JOSEPH and RL, larger pedunculated tag PML  ARMANDO - good tone, no mass  Anoscopy - Grade 1 internal hemorrhoids, +prolapsing internal hypertrophied papilla      Musculoskeletal:         General: Normal range of motion.   Skin:     General: Skin is warm and dry.   Neurological:      Mental Status: She is alert and oriented to person, place, and time.           No results found for: "WBC", "HGB", "HCT", "MCV", "PLT"  BMP  Lab Results   Component Value Date     (L) 08/15/2024    K 3.6 08/15/2024    CO2 25 08/15/2024    BUN 7.6 (L) 08/15/2024    CREATININE 0.68 08/15/2024    CALCIUM 8.9 08/15/2024    ANIONGAP 7 (L) 08/15/2024     CMP  Sodium   Date Value Ref Range Status   08/15/2024 131 (L) 136 - 145 mmol/L Final     Potassium   Date Value Ref Range Status   08/15/2024 3.6 3.5 - 5.1 mmol/L Final     Carbon Dioxide   Date Value Ref Range Status   08/15/2024 25 20 - 31 mmol/L Final     Glucose   Date Value Ref Range Status   10/28/2022 89 65 - 99 mg/dL Final     Comment:                  Fasting reference interval     BUN   Date Value Ref Range Status   08/15/2024 7.6 (L) 9.0 - 23.0 mg/dL Final     Creatinine   Date Value Ref Range Status   08/15/2024 0.68 0.55 - 1.02 mg/dL Final     Calcium   Date Value Ref Range Status   08/15/2024 8.9 8.3 - 10.6 mg/dL Final     Albumin   Date Value Ref Range Status   08/15/2024 4.6 3.2 - 4.8 g/dL Final     Albumin Level   Date Value Ref Range Status   10/28/2022 4.4 3.6 - 5.1 g/dL Final     Total Bilirubin   Date Value Ref Range Status   08/15/2024 0.6 0.2 - 1.0 mg/dL Final     Alkaline Phosphatase   Date Value Ref Range Status   10/28/2022 50 31 - 125 U/L Final     AST   Date Value Ref Range Status   08/15/2024 22 <34 U/L Final     ALT " "  Date Value Ref Range Status   08/15/2024 14 10 - 49 U/L Final     Anion Gap   Date Value Ref Range Status   08/15/2024 7 (L) 8 - 16 Final     No results found for: "CEA"  No results found for: "CRP"        Assessment:       1. Hemorrhoids        Plan:   Patient wishes to undergo excision of symptomatic tag/papilla.    I have discussed the procedure at length with Julia Mejia.  We discussed the rationale, risks, benefits, and alternatives in depth.  We discussed the expected outcomes and potential complications including but not limited to wound dehiscence/delayed wound healing, bleeding, infection, recurrence, prolonged pain, need for further procedures, and altered continence.  She verbalized her understanding of the procedure and wishes to proceed.  Written consent was obtained.      Jaxon Bailey MD, FACS, FASCRS  , Department of Colon & Rectal Surgery     This note was created using voice recognition software, and may contain some unrecognized transcriptional errors.     "

## 2025-02-21 LAB
FINAL PATHOLOGIC DIAGNOSIS: NORMAL
GROSS: NORMAL
Lab: NORMAL
MICROSCOPIC EXAM: NORMAL

## 2025-03-20 ENCOUNTER — TELEPHONE (OUTPATIENT)
Dept: SURGERY | Facility: CLINIC | Age: 25
End: 2025-03-20
Payer: COMMERCIAL

## 2025-03-20 NOTE — TELEPHONE ENCOUNTER
Called pt to confirm her appointment with Dr. Bailey on 3/21/25 at 3:20 PM at [facility]. Pt expressed understanding of appointment's date, time and location. Pt stated she would be there.

## 2025-03-21 ENCOUNTER — OFFICE VISIT (OUTPATIENT)
Facility: CLINIC | Age: 25
End: 2025-03-21
Payer: COMMERCIAL

## 2025-03-21 VITALS
SYSTOLIC BLOOD PRESSURE: 139 MMHG | WEIGHT: 165.38 LBS | DIASTOLIC BLOOD PRESSURE: 80 MMHG | HEART RATE: 81 BPM | BODY MASS INDEX: 27.56 KG/M2 | HEIGHT: 65 IN

## 2025-03-21 DIAGNOSIS — K64.4 HEMORRHOIDAL SKIN TAGS: ICD-10-CM

## 2025-03-21 DIAGNOSIS — K62.89 HYPERTROPHIED ANAL PAPILLA: Primary | ICD-10-CM

## 2025-03-21 PROCEDURE — 99999 PR PBB SHADOW E&M-EST. PATIENT-LVL III: CPT | Mod: PBBFAC,,, | Performed by: COLON & RECTAL SURGERY

## 2025-03-21 RX ORDER — PIMECROLIMUS 10 MG/G
CREAM TOPICAL
COMMUNITY
Start: 2025-03-21

## 2025-03-21 RX ORDER — CLASCOTERONE 1 G/100G
CREAM TOPICAL
COMMUNITY
Start: 2025-02-28

## 2025-03-21 RX ORDER — TRETINOIN 0.25 MG/G
CREAM TOPICAL
COMMUNITY

## 2025-03-21 NOTE — PROGRESS NOTES
CRS Post-operative visit    Visit Info:     Procedure: Excision of external hemorrhoidal skin tags and anal canal hypertrophied anal papilla     Date of Procedure: February 14, 2025    Indication: 24-year-old female who was seen in the office with complaints of painful anal skin tags.  After discussing her options, she elected to proceed with surgical excision.     Operative Findings:   External hemorrhoidal skin tags in the posterior midline, right lateral, and right anterolateral positions  Prolapsing hypertrophied anal papilla in the posterior midline    Current Status: Doing well postoperatively.  No bleeding, minimal discomfort, no F/C, no issues with continence    Pathology:   - ANAL FIBROEPITHELIAL POLYPS/ HYPERTROPHIED PAPILLAE     Physical Exam:  General: Other female in NAD   Neuro: aaox4   Respiratory: resps even unlabored  Extremities: Warm dry and intact  Anorectal: perianal wounds healed, no erythema/induration    Assessment:  1. Hypertrophied anal papilla        2. Hemorrhoidal skin tags        S/p excision of external hemorrhoidal skin tags and anal canal hypertrophied anal papilla    Plan:  Doing well post-op  Avoid straining/constipation  RTO prn    Jaxon Bailey MD, FACS, FASCRS  , Department of Colon & Rectal Surgery     This note was created using voice recognition software, and may contain some unrecognized transcriptional errors.

## (undated) DEVICE — SPONGE COTTON TRAY 4X4IN

## (undated) DEVICE — GLOVE SENSICARE PI SURG 7.5

## (undated) DEVICE — GLOVE BIOGEL SKINSENSE PI 8.0

## (undated) DEVICE — TRAY MINOR GEN SURG OMC

## (undated) DEVICE — NDL ECLIPSE SAFETY 18GX1-1/2IN

## (undated) DEVICE — JELLY LUBRICANT STERILE 4 OZ

## (undated) DEVICE — NDL ECLIPSE SAF REG 25GX1.5IN

## (undated) DEVICE — SYR ONLY LUER LOCK 20CC

## (undated) DEVICE — DRAPE LAP T SHT W/ INSTR PAD